# Patient Record
Sex: FEMALE | Race: WHITE | NOT HISPANIC OR LATINO | URBAN - METROPOLITAN AREA
[De-identification: names, ages, dates, MRNs, and addresses within clinical notes are randomized per-mention and may not be internally consistent; named-entity substitution may affect disease eponyms.]

---

## 2022-11-11 ENCOUNTER — OFFICE (OUTPATIENT)
Dept: URBAN - METROPOLITAN AREA CLINIC 18 | Facility: CLINIC | Age: 52
End: 2022-11-11

## 2022-11-11 VITALS
DIASTOLIC BLOOD PRESSURE: 74 MMHG | WEIGHT: 157 LBS | HEIGHT: 63 IN | HEART RATE: 83 BPM | SYSTOLIC BLOOD PRESSURE: 112 MMHG

## 2022-11-11 DIAGNOSIS — D50.9 IRON DEFICIENCY ANEMIA, UNSPECIFIED: ICD-10-CM

## 2022-11-11 DIAGNOSIS — D80.2 SELECTIVE DEFICIENCY OF IMMUNOGLOBULIN A [IGA]: ICD-10-CM

## 2022-11-11 LAB
CBC, PLATELET CT  AND  DIFF: ABS BASOPHIL: 0.1 K/UL
CBC, PLATELET CT  AND  DIFF: ABS EOSINOPHIL: 0.1 K/UL
CBC, PLATELET CT  AND  DIFF: ABS LYMPHOCYTE: 1.2 K/UL
CBC, PLATELET CT  AND  DIFF: ABS MONOCYTE: 0.8 K/UL
CBC, PLATELET CT  AND  DIFF: ABS NEUTROPHIL: 3.8 K/UL
CBC, PLATELET CT  AND  DIFF: BASOPHIL: 1.2 %
CBC, PLATELET CT  AND  DIFF: DIFFERENTIAL: (no result)
CBC, PLATELET CT  AND  DIFF: EOSINOPHIL: 1 %
CBC, PLATELET CT  AND  DIFF: HEMATOCRIT: 32 % — LOW
CBC, PLATELET CT  AND  DIFF: HEMOGLOBIN: 9.2 G/DL — LOW
CBC, PLATELET CT  AND  DIFF: LYMPHOCYTE: 20.8 %
CBC, PLATELET CT  AND  DIFF: MCH: 20 PG — LOW
CBC, PLATELET CT  AND  DIFF: MCHC: 28.8 G/DL — LOW
CBC, PLATELET CT  AND  DIFF: MCV: 69.4 FL — LOW
CBC, PLATELET CT  AND  DIFF: MONOCYTE: 12.8 % — HIGH
CBC, PLATELET CT  AND  DIFF: MPV: 10.3 FL
CBC, PLATELET CT  AND  DIFF: NEUTROPHIL: 64.2 %
CBC, PLATELET CT  AND  DIFF: PLATELET COMMENT: (no result)
CBC, PLATELET CT  AND  DIFF: PLATELET COUNT: 351 K/UL
CBC, PLATELET CT  AND  DIFF: RBC MORPHOLOGY: (no result)
CBC, PLATELET CT  AND  DIFF: RBC: 4.61 M/UL
CBC, PLATELET CT  AND  DIFF: RDW: 16.6 % — HIGH
CBC, PLATELET CT  AND  DIFF: WBC COUNT: 6 K/UL
FERRITIN: 4 NG/ML — LOW
IRON PROFILE: IRON BINDING CAPACITY: 436 MCG/DL
IRON PROFILE: IRON SATURATION: 4 % — LOW
IRON PROFILE: IRON: 18 MCG/DL — LOW

## 2022-11-11 PROCEDURE — 99213 OFFICE O/P EST LOW 20 MIN: CPT | Performed by: INTERNAL MEDICINE

## 2022-11-11 NOTE — INTERFACERESULTNOTES
Pt called back.  She stated understanding and asked that her labs be sent to Dr. DAVID Jonas-her new PCP.  I let her know I would send them.  Referral entered and faxed to Dr. JOSE ALBERTO Weinberg's group.

## 2023-11-22 ENCOUNTER — OFFICE (OUTPATIENT)
Dept: URBAN - METROPOLITAN AREA CLINIC 18 | Facility: CLINIC | Age: 53
End: 2023-11-22

## 2023-11-22 VITALS
WEIGHT: 150 LBS | HEART RATE: 77 BPM | SYSTOLIC BLOOD PRESSURE: 110 MMHG | HEIGHT: 63 IN | DIASTOLIC BLOOD PRESSURE: 74 MMHG

## 2023-11-22 DIAGNOSIS — D50.9 IRON DEFICIENCY ANEMIA, UNSPECIFIED: ICD-10-CM

## 2023-11-22 PROCEDURE — 99214 OFFICE O/P EST MOD 30 MIN: CPT | Performed by: INTERNAL MEDICINE

## 2023-11-30 ENCOUNTER — OFFICE (OUTPATIENT)
Dept: URBAN - METROPOLITAN AREA CLINIC 18 | Facility: CLINIC | Age: 53
End: 2023-11-30

## 2023-11-30 VITALS — HEIGHT: 63 IN

## 2023-11-30 DIAGNOSIS — D50.9 IRON DEFICIENCY ANEMIA, UNSPECIFIED: ICD-10-CM

## 2023-11-30 PROCEDURE — 91110 GI TRC IMG INTRAL ESOPH-ILE: CPT | Performed by: INTERNAL MEDICINE

## 2023-12-11 ENCOUNTER — OFFICE (OUTPATIENT)
Dept: URBAN - METROPOLITAN AREA INFUSION 7 | Facility: INFUSION | Age: 53
End: 2023-12-11

## 2023-12-11 VITALS
HEART RATE: 87 BPM | HEART RATE: 74 BPM | RESPIRATION RATE: 16 BRPM | DIASTOLIC BLOOD PRESSURE: 75 MMHG | TEMPERATURE: 98.7 F | HEIGHT: 63 IN | SYSTOLIC BLOOD PRESSURE: 102 MMHG | DIASTOLIC BLOOD PRESSURE: 69 MMHG | WEIGHT: 153.4 LBS | SYSTOLIC BLOOD PRESSURE: 112 MMHG | DIASTOLIC BLOOD PRESSURE: 70 MMHG | HEART RATE: 71 BPM | SYSTOLIC BLOOD PRESSURE: 128 MMHG

## 2023-12-11 DIAGNOSIS — D50.9 IRON DEFICIENCY ANEMIA, UNSPECIFIED: ICD-10-CM

## 2023-12-11 PROCEDURE — 96365 THER/PROPH/DIAG IV INF INIT: CPT | Performed by: INTERNAL MEDICINE

## 2023-12-11 PROCEDURE — S0028 INJECTION, FAMOTIDINE, 20 MG: HCPCS | Performed by: INTERNAL MEDICINE

## 2023-12-11 PROCEDURE — 96375 TX/PRO/DX INJ NEW DRUG ADDON: CPT | Performed by: INTERNAL MEDICINE

## 2023-12-15 ENCOUNTER — OFFICE (OUTPATIENT)
Dept: URBAN - METROPOLITAN AREA INFUSION 7 | Facility: INFUSION | Age: 53
End: 2023-12-15

## 2023-12-15 VITALS
DIASTOLIC BLOOD PRESSURE: 64 MMHG | RESPIRATION RATE: 16 BRPM | SYSTOLIC BLOOD PRESSURE: 130 MMHG | HEIGHT: 63 IN | DIASTOLIC BLOOD PRESSURE: 75 MMHG | WEIGHT: 153.8 LBS | SYSTOLIC BLOOD PRESSURE: 115 MMHG | DIASTOLIC BLOOD PRESSURE: 74 MMHG | HEART RATE: 81 BPM | HEART RATE: 89 BPM | TEMPERATURE: 98.6 F | HEART RATE: 84 BPM | SYSTOLIC BLOOD PRESSURE: 110 MMHG | TEMPERATURE: 98.3 F

## 2023-12-15 DIAGNOSIS — D50.9 IRON DEFICIENCY ANEMIA, UNSPECIFIED: ICD-10-CM

## 2023-12-15 PROCEDURE — S0028 INJECTION, FAMOTIDINE, 20 MG: HCPCS | Performed by: INTERNAL MEDICINE

## 2023-12-15 PROCEDURE — 96375 TX/PRO/DX INJ NEW DRUG ADDON: CPT | Performed by: INTERNAL MEDICINE

## 2023-12-15 PROCEDURE — 96365 THER/PROPH/DIAG IV INF INIT: CPT | Performed by: INTERNAL MEDICINE

## 2024-01-03 ENCOUNTER — OFFICE (OUTPATIENT)
Dept: URBAN - METROPOLITAN AREA CLINIC 18 | Facility: CLINIC | Age: 54
End: 2024-01-03

## 2024-01-03 VITALS
OXYGEN SATURATION: 98 % | HEIGHT: 63 IN | WEIGHT: 154 LBS | SYSTOLIC BLOOD PRESSURE: 124 MMHG | DIASTOLIC BLOOD PRESSURE: 78 MMHG | HEART RATE: 74 BPM

## 2024-01-03 DIAGNOSIS — R10.2 PELVIC AND PERINEAL PAIN: ICD-10-CM

## 2024-01-03 DIAGNOSIS — D50.9 IRON DEFICIENCY ANEMIA, UNSPECIFIED: ICD-10-CM

## 2024-01-03 PROCEDURE — 99214 OFFICE O/P EST MOD 30 MIN: CPT | Performed by: INTERNAL MEDICINE
